# Patient Record
Sex: FEMALE | Race: WHITE | NOT HISPANIC OR LATINO | Employment: OTHER | URBAN - METROPOLITAN AREA
[De-identification: names, ages, dates, MRNs, and addresses within clinical notes are randomized per-mention and may not be internally consistent; named-entity substitution may affect disease eponyms.]

---

## 2017-11-13 ENCOUNTER — ESTABLISHED COMPREHENSIVE EXAM (OUTPATIENT)
Dept: URBAN - METROPOLITAN AREA CLINIC 43 | Facility: CLINIC | Age: 82
End: 2017-11-13

## 2017-11-13 DIAGNOSIS — C44.119: ICD-10-CM

## 2017-11-13 DIAGNOSIS — H02.831: ICD-10-CM

## 2017-11-13 DIAGNOSIS — Z96.1: ICD-10-CM

## 2017-11-13 DIAGNOSIS — H02.834: ICD-10-CM

## 2017-11-13 DIAGNOSIS — H04.123: ICD-10-CM

## 2017-11-13 PROCEDURE — 1036F TOBACCO NON-USER: CPT

## 2017-11-13 PROCEDURE — G8427 DOCREV CUR MEDS BY ELIG CLIN: HCPCS

## 2017-11-13 PROCEDURE — G8785 BP SCRN NO PERF AT INTERVAL: HCPCS

## 2017-11-13 PROCEDURE — 92015 DETERMINE REFRACTIVE STATE: CPT

## 2017-11-13 PROCEDURE — 92014 COMPRE OPH EXAM EST PT 1/>: CPT

## 2017-11-13 ASSESSMENT — VISUAL ACUITY
OD_SC: 20/30
OD_SC: J1
OS_SC: 20/30
OS_SC: J2
OU_SC: J1
OU_SC: 20/30

## 2017-11-13 ASSESSMENT — TONOMETRY
OD_IOP_MMHG: 15
OS_IOP_MMHG: 17

## 2017-11-30 ENCOUNTER — ESTABLISHED PATIENT (OUTPATIENT)
Dept: URBAN - METROPOLITAN AREA CLINIC 43 | Facility: CLINIC | Age: 82
End: 2017-11-30

## 2017-11-30 DIAGNOSIS — H02.834: ICD-10-CM

## 2017-11-30 DIAGNOSIS — H02.831: ICD-10-CM

## 2017-11-30 DIAGNOSIS — H02.832: ICD-10-CM

## 2017-11-30 DIAGNOSIS — H04.123: ICD-10-CM

## 2017-11-30 DIAGNOSIS — H02.835: ICD-10-CM

## 2017-11-30 PROCEDURE — 1036F TOBACCO NON-USER: CPT

## 2017-11-30 PROCEDURE — G8427 DOCREV CUR MEDS BY ELIG CLIN: HCPCS

## 2017-11-30 PROCEDURE — 4040F PNEUMOC VAC/ADMIN/RCVD: CPT

## 2017-11-30 PROCEDURE — 92285 EXTERNAL OCULAR PHOTOGRAPHY: CPT

## 2017-11-30 PROCEDURE — G8785 BP SCRN NO PERF AT INTERVAL: HCPCS

## 2017-11-30 PROCEDURE — 99213 OFFICE O/P EST LOW 20 MIN: CPT

## 2017-11-30 ASSESSMENT — VISUAL ACUITY
OS_SC: 20/30-2
OD_SC: 20/30

## 2017-12-05 ENCOUNTER — VISUAL FIELD (OUTPATIENT)
Dept: URBAN - METROPOLITAN AREA CLINIC 43 | Facility: CLINIC | Age: 82
End: 2017-12-05

## 2017-12-05 DIAGNOSIS — H02.831: ICD-10-CM

## 2017-12-05 DIAGNOSIS — H02.834: ICD-10-CM

## 2017-12-05 PROCEDURE — 92082 INTERMEDIATE VISUAL FIELD XM: CPT

## 2017-12-05 PROCEDURE — 99211T TECH SERVICE

## 2017-12-08 NOTE — PATIENT DISCUSSION
Bilateral 2+ peripheral drusen, all four quadrants.  Could be related to an elevated cholesterol level and suggest re-evaluation by Dr. Kay Shah.

## 2018-02-06 ENCOUNTER — SURGERY/PROCEDURE (OUTPATIENT)
Dept: URBAN - METROPOLITAN AREA CLINIC 43 | Facility: CLINIC | Age: 83
End: 2018-02-06

## 2018-02-06 DIAGNOSIS — H02.834: ICD-10-CM

## 2018-02-06 DIAGNOSIS — H02.831: ICD-10-CM

## 2018-02-06 PROCEDURE — 1582350 UPPER BLEPH PER EYE FUNCTIONAL-BILATERAL

## 2018-02-13 ENCOUNTER — POST-OP (OUTPATIENT)
Dept: URBAN - METROPOLITAN AREA CLINIC 39 | Facility: CLINIC | Age: 83
End: 2018-02-13

## 2018-02-13 DIAGNOSIS — Z98.890: ICD-10-CM

## 2018-02-13 PROCEDURE — 99024 POSTOP FOLLOW-UP VISIT: CPT

## 2018-02-13 ASSESSMENT — VISUAL ACUITY: OD_SC: 20/30-1

## 2018-04-03 ENCOUNTER — EST. PATIENT EMERGENCY (OUTPATIENT)
Dept: URBAN - METROPOLITAN AREA CLINIC 43 | Facility: CLINIC | Age: 83
End: 2018-04-03

## 2018-04-03 DIAGNOSIS — H01.004: ICD-10-CM

## 2018-04-03 DIAGNOSIS — H01.001: ICD-10-CM

## 2018-04-03 PROCEDURE — 99212 OFFICE O/P EST SF 10 MIN: CPT

## 2018-04-03 PROCEDURE — 4040F PNEUMOC VAC/ADMIN/RCVD: CPT

## 2018-04-03 PROCEDURE — G8785 BP SCRN NO PERF AT INTERVAL: HCPCS

## 2018-04-03 PROCEDURE — 1036F TOBACCO NON-USER: CPT

## 2018-04-03 PROCEDURE — G8427 DOCREV CUR MEDS BY ELIG CLIN: HCPCS

## 2018-04-03 ASSESSMENT — VISUAL ACUITY
OD_SC: 20/30-1
OS_SC: 20/25-2

## 2018-04-03 ASSESSMENT — TONOMETRY
OD_IOP_MMHG: 15
OS_IOP_MMHG: 15

## 2018-09-20 NOTE — PATIENT DISCUSSION
Bilateral 2+ peripheral drusen, all four quadrants.  Could be related to an elevated cholesterol level and suggest re-evaluation by Dr. Mayank Bonner.

## 2018-11-13 ENCOUNTER — ESTABLISHED COMPREHENSIVE EXAM (OUTPATIENT)
Dept: URBAN - METROPOLITAN AREA CLINIC 43 | Facility: CLINIC | Age: 83
End: 2018-11-13

## 2018-11-13 DIAGNOSIS — H04.123: ICD-10-CM

## 2018-11-13 DIAGNOSIS — Z96.1: ICD-10-CM

## 2018-11-13 PROCEDURE — 1036F TOBACCO NON-USER: CPT

## 2018-11-13 PROCEDURE — G8427 DOCREV CUR MEDS BY ELIG CLIN: HCPCS

## 2018-11-13 PROCEDURE — 92015 DETERMINE REFRACTIVE STATE: CPT

## 2018-11-13 PROCEDURE — 92014 COMPRE OPH EXAM EST PT 1/>: CPT

## 2018-11-13 PROCEDURE — G9903 PT SCRN TBCO ID AS NON USER: HCPCS

## 2018-11-13 PROCEDURE — G8785 BP SCRN NO PERF AT INTERVAL: HCPCS

## 2018-11-13 ASSESSMENT — VISUAL ACUITY
OD_BAT: 20/50
OS_BAT: 20/60
OS_SC: 20/30
OD_SC: J2
OD_SC: 20/25-2
OS_SC: J1

## 2018-11-13 ASSESSMENT — TONOMETRY
OS_IOP_MMHG: 17
OD_IOP_MMHG: 15

## 2019-04-30 ENCOUNTER — EST. PATIENT EMERGENCY (OUTPATIENT)
Dept: URBAN - METROPOLITAN AREA CLINIC 43 | Facility: CLINIC | Age: 84
End: 2019-04-30

## 2019-04-30 DIAGNOSIS — H04.123: ICD-10-CM

## 2019-04-30 DIAGNOSIS — D23.112: ICD-10-CM

## 2019-04-30 PROCEDURE — 99212 OFFICE O/P EST SF 10 MIN: CPT

## 2019-04-30 ASSESSMENT — TONOMETRY
OS_IOP_MMHG: 14
OD_IOP_MMHG: 14

## 2019-04-30 ASSESSMENT — VISUAL ACUITY
OD_SC: 20/30+2
OS_SC: 20/25-2
OD_SC: J1
OS_SC: J1

## 2020-01-15 ENCOUNTER — ESTABLISHED COMPREHENSIVE EXAM (OUTPATIENT)
Dept: URBAN - METROPOLITAN AREA CLINIC 43 | Facility: CLINIC | Age: 85
End: 2020-01-15

## 2020-01-15 DIAGNOSIS — H04.123: ICD-10-CM

## 2020-01-15 DIAGNOSIS — H26.492: ICD-10-CM

## 2020-01-15 PROCEDURE — 92015 DETERMINE REFRACTIVE STATE: CPT

## 2020-01-15 PROCEDURE — 92014 COMPRE OPH EXAM EST PT 1/>: CPT

## 2020-01-15 ASSESSMENT — TONOMETRY
OS_IOP_MMHG: 14
OD_IOP_MMHG: 14

## 2020-01-15 ASSESSMENT — VISUAL ACUITY
OD_SC: J2+
OS_SC: J1
OD_SC: 20/30+2
OS_SC: 20/30+2

## 2020-05-20 ENCOUNTER — EST. PATIENT EMERGENCY (OUTPATIENT)
Dept: URBAN - METROPOLITAN AREA CLINIC 43 | Facility: CLINIC | Age: 85
End: 2020-05-20

## 2020-05-20 DIAGNOSIS — H04.123: ICD-10-CM

## 2020-05-20 DIAGNOSIS — H26.492: ICD-10-CM

## 2020-05-20 PROCEDURE — 92012 INTRM OPH EXAM EST PATIENT: CPT

## 2020-05-20 ASSESSMENT — TONOMETRY
OS_IOP_MMHG: 10
OD_IOP_MMHG: 8

## 2020-05-20 ASSESSMENT — VISUAL ACUITY
OD_SC: 20/40+1
OS_SC: 20/30-2

## 2021-02-03 ENCOUNTER — ESTABLISHED COMPREHENSIVE EXAM (OUTPATIENT)
Dept: URBAN - METROPOLITAN AREA CLINIC 43 | Facility: CLINIC | Age: 86
End: 2021-02-03

## 2021-02-03 DIAGNOSIS — H04.123: ICD-10-CM

## 2021-02-03 DIAGNOSIS — H26.492: ICD-10-CM

## 2021-02-03 PROCEDURE — 92014 COMPRE OPH EXAM EST PT 1/>: CPT

## 2021-02-03 PROCEDURE — 92015 DETERMINE REFRACTIVE STATE: CPT

## 2021-02-03 ASSESSMENT — TONOMETRY
OD_IOP_MMHG: 13
OS_IOP_MMHG: 13

## 2021-02-03 ASSESSMENT — VISUAL ACUITY
OD_SC: 20/30-2
OS_SC: 20/25-2
OS_SC: J1
OD_SC: J1

## 2021-04-19 ENCOUNTER — EST. PATIENT EMERGENCY (OUTPATIENT)
Dept: URBAN - METROPOLITAN AREA CLINIC 43 | Facility: CLINIC | Age: 86
End: 2021-04-19

## 2021-04-19 DIAGNOSIS — H04.123: ICD-10-CM

## 2021-04-19 DIAGNOSIS — D23.112: ICD-10-CM

## 2021-04-19 PROCEDURE — 92012 INTRM OPH EXAM EST PATIENT: CPT

## 2021-04-19 ASSESSMENT — VISUAL ACUITY
OS_SC: J1
OD_SC: J1-
OS_SC: 20/30-1
OD_SC: 20/30-1

## 2021-04-19 ASSESSMENT — TONOMETRY
OS_IOP_MMHG: 13
OD_IOP_MMHG: 13

## 2021-04-27 ENCOUNTER — ESTABLISHED PATIENT (OUTPATIENT)
Dept: URBAN - METROPOLITAN AREA CLINIC 44 | Facility: CLINIC | Age: 86
End: 2021-04-27

## 2021-04-27 DIAGNOSIS — D23.112: ICD-10-CM

## 2021-04-27 PROCEDURE — 67840 REMOVE EYELID LESION: CPT

## 2021-04-27 PROCEDURE — 92285 EXTERNAL OCULAR PHOTOGRAPHY: CPT

## 2021-04-27 PROCEDURE — 99213 OFFICE O/P EST LOW 20 MIN: CPT

## 2021-04-27 RX ORDER — ERYTHROMYCIN 5 MG/G
OINTMENT OPHTHALMIC TWICE A DAY
End: 2021-05-11

## 2021-04-27 ASSESSMENT — VISUAL ACUITY
OD_SC: 20/30-1
OS_SC: 20/30-1

## 2021-10-13 ENCOUNTER — ESTABLISHED COMPREHENSIVE EXAM (OUTPATIENT)
Dept: URBAN - METROPOLITAN AREA CLINIC 43 | Facility: CLINIC | Age: 86
End: 2021-10-13

## 2021-10-13 DIAGNOSIS — D23.112: ICD-10-CM

## 2021-10-13 DIAGNOSIS — H04.123: ICD-10-CM

## 2021-10-13 DIAGNOSIS — H26.492: ICD-10-CM

## 2021-10-13 PROCEDURE — 92015 DETERMINE REFRACTIVE STATE: CPT

## 2021-10-13 PROCEDURE — 92014 COMPRE OPH EXAM EST PT 1/>: CPT

## 2021-10-13 ASSESSMENT — TONOMETRY
OD_IOP_MMHG: 11
OS_IOP_MMHG: 11

## 2021-10-13 ASSESSMENT — VISUAL ACUITY
OS_SC: J1
OD_SC: J1-
OD_SC: 20/30-2
OS_SC: 20/30-2

## 2022-03-07 ENCOUNTER — FOLLOW UP (OUTPATIENT)
Dept: URBAN - METROPOLITAN AREA CLINIC 43 | Facility: CLINIC | Age: 87
End: 2022-03-07

## 2022-03-07 DIAGNOSIS — H04.123: ICD-10-CM

## 2022-03-07 PROCEDURE — 99212 OFFICE O/P EST SF 10 MIN: CPT

## 2022-03-07 ASSESSMENT — VISUAL ACUITY
OS_SC: J1
OD_SC: 20/30-2
OS_SC: 20/30
OD_SC: J2

## 2022-12-20 ENCOUNTER — COMPREHENSIVE EXAM (OUTPATIENT)
Dept: URBAN - METROPOLITAN AREA CLINIC 43 | Facility: CLINIC | Age: 87
End: 2022-12-20

## 2022-12-20 PROCEDURE — 92015 DETERMINE REFRACTIVE STATE: CPT

## 2022-12-20 PROCEDURE — 92014 COMPRE OPH EXAM EST PT 1/>: CPT

## 2022-12-20 ASSESSMENT — TONOMETRY
OD_IOP_MMHG: 10
OS_IOP_MMHG: 11

## 2022-12-20 ASSESSMENT — VISUAL ACUITY
OS_SC: J1
OS_SC: 20/30-2
OD_SC: J1-
OD_SC: 20/30-2

## 2023-12-12 ENCOUNTER — COMPREHENSIVE EXAM (OUTPATIENT)
Dept: URBAN - METROPOLITAN AREA CLINIC 43 | Facility: CLINIC | Age: 88
End: 2023-12-12

## 2023-12-12 DIAGNOSIS — H04.123: ICD-10-CM

## 2023-12-12 DIAGNOSIS — H26.492: ICD-10-CM

## 2023-12-12 PROCEDURE — 92014 COMPRE OPH EXAM EST PT 1/>: CPT

## 2023-12-12 ASSESSMENT — TONOMETRY
OD_IOP_MMHG: 10
OS_IOP_MMHG: 10

## 2023-12-12 ASSESSMENT — VISUAL ACUITY
OD_SC: J2-
OS_SC: J1
OD_SC: 20/40+1
OS_SC: 20/25-1

## 2024-12-17 ENCOUNTER — COMPREHENSIVE EXAM (OUTPATIENT)
Age: 89
End: 2024-12-17

## 2024-12-17 DIAGNOSIS — H34.8322: ICD-10-CM

## 2024-12-17 DIAGNOSIS — H04.123: ICD-10-CM

## 2024-12-17 DIAGNOSIS — H52.7: ICD-10-CM

## 2024-12-17 DIAGNOSIS — H26.492: ICD-10-CM

## 2024-12-17 PROCEDURE — 92014 COMPRE OPH EXAM EST PT 1/>: CPT

## 2024-12-17 PROCEDURE — 92015 DETERMINE REFRACTIVE STATE: CPT

## 2025-08-22 ENCOUNTER — EMERGENCY VISIT (OUTPATIENT)
Age: 89
End: 2025-08-22

## 2025-08-22 DIAGNOSIS — H04.123: ICD-10-CM

## 2025-08-22 PROCEDURE — 92012 INTRM OPH EXAM EST PATIENT: CPT
